# Patient Record
Sex: FEMALE | Race: WHITE | NOT HISPANIC OR LATINO | URBAN - METROPOLITAN AREA
[De-identification: names, ages, dates, MRNs, and addresses within clinical notes are randomized per-mention and may not be internally consistent; named-entity substitution may affect disease eponyms.]

---

## 2024-03-11 ENCOUNTER — TELEPHONE (OUTPATIENT)
Dept: CARDIOLOGY | Facility: HOSPITAL | Age: 59
End: 2024-03-11
Payer: MEDICARE

## 2024-03-30 DIAGNOSIS — I50.22 CHRONIC SYSTOLIC HEART FAILURE (MULTI): Primary | ICD-10-CM

## 2024-04-01 RX ORDER — SPIRONOLACTONE 25 MG/1
25 TABLET ORAL DAILY
Qty: 90 TABLET | Refills: 3 | Status: SHIPPED | OUTPATIENT
Start: 2024-04-01 | End: 2025-04-01

## 2024-05-09 DIAGNOSIS — I50.20 UNSPECIFIED SYSTOLIC (CONGESTIVE) HEART FAILURE (MULTI): ICD-10-CM

## 2024-05-09 RX ORDER — SACUBITRIL AND VALSARTAN 49; 51 MG/1; MG/1
1 TABLET, FILM COATED ORAL 2 TIMES DAILY
Qty: 180 TABLET | Refills: 0 | Status: SHIPPED | OUTPATIENT
Start: 2024-05-09

## 2024-07-19 DIAGNOSIS — I50.22 CHRONIC SYSTOLIC HEART FAILURE (MULTI): Primary | ICD-10-CM

## 2024-08-07 DIAGNOSIS — I50.22 CHRONIC SYSTOLIC HEART FAILURE (MULTI): Primary | ICD-10-CM

## 2024-08-07 RX ORDER — VALSARTAN 80 MG/1
80 TABLET ORAL 2 TIMES DAILY
Qty: 60 TABLET | Refills: 1 | Status: SHIPPED | OUTPATIENT
Start: 2024-08-07

## 2024-08-22 ENCOUNTER — APPOINTMENT (OUTPATIENT)
Dept: CARDIOLOGY | Facility: HOSPITAL | Age: 59
End: 2024-08-22
Payer: MEDICARE

## 2024-09-05 ENCOUNTER — APPOINTMENT (OUTPATIENT)
Dept: CARDIOLOGY | Facility: HOSPITAL | Age: 59
End: 2024-09-05
Payer: MEDICARE

## 2024-09-05 ENCOUNTER — OFFICE VISIT (OUTPATIENT)
Dept: CARDIOLOGY | Facility: HOSPITAL | Age: 59
End: 2024-09-05
Payer: MEDICARE

## 2024-09-05 ENCOUNTER — HOSPITAL ENCOUNTER (OUTPATIENT)
Dept: CARDIOLOGY | Facility: HOSPITAL | Age: 59
Discharge: HOME | End: 2024-09-05
Payer: MEDICARE

## 2024-09-05 VITALS
OXYGEN SATURATION: 94 % | HEART RATE: 73 BPM | WEIGHT: 200.4 LBS | HEIGHT: 62 IN | DIASTOLIC BLOOD PRESSURE: 76 MMHG | BODY MASS INDEX: 36.88 KG/M2 | SYSTOLIC BLOOD PRESSURE: 115 MMHG

## 2024-09-05 DIAGNOSIS — I50.22 CHRONIC SYSTOLIC HEART FAILURE (MULTI): Primary | ICD-10-CM

## 2024-09-05 DIAGNOSIS — I25.5 ISCHEMIC CARDIOMYOPATHY: ICD-10-CM

## 2024-09-05 DIAGNOSIS — I25.10 ASCVD (ARTERIOSCLEROTIC CARDIOVASCULAR DISEASE): ICD-10-CM

## 2024-09-05 DIAGNOSIS — I50.22 CHRONIC SYSTOLIC HEART FAILURE (MULTI): ICD-10-CM

## 2024-09-05 DIAGNOSIS — E78.5 DYSLIPIDEMIA: ICD-10-CM

## 2024-09-05 PROCEDURE — 99214 OFFICE O/P EST MOD 30 MIN: CPT | Performed by: INTERNAL MEDICINE

## 2024-09-05 PROCEDURE — 93005 ELECTROCARDIOGRAM TRACING: CPT | Performed by: INTERNAL MEDICINE

## 2024-09-05 PROCEDURE — 93306 TTE W/DOPPLER COMPLETE: CPT

## 2024-09-05 PROCEDURE — 1036F TOBACCO NON-USER: CPT | Performed by: INTERNAL MEDICINE

## 2024-09-05 PROCEDURE — 2500000004 HC RX 250 GENERAL PHARMACY W/ HCPCS (ALT 636 FOR OP/ED): Performed by: INTERNAL MEDICINE

## 2024-09-05 PROCEDURE — 93306 TTE W/DOPPLER COMPLETE: CPT | Performed by: STUDENT IN AN ORGANIZED HEALTH CARE EDUCATION/TRAINING PROGRAM

## 2024-09-05 PROCEDURE — 3008F BODY MASS INDEX DOCD: CPT | Performed by: INTERNAL MEDICINE

## 2024-09-05 RX ORDER — ASPIRIN 81 MG/1
81 TABLET ORAL DAILY
COMMUNITY

## 2024-09-05 RX ORDER — METOPROLOL SUCCINATE 100 MG/1
150 TABLET, EXTENDED RELEASE ORAL DAILY
COMMUNITY

## 2024-09-05 RX ORDER — FUROSEMIDE 20 MG/1
20 TABLET ORAL DAILY
COMMUNITY

## 2024-09-05 RX ORDER — VALSARTAN 80 MG/1
80 TABLET ORAL 2 TIMES DAILY
Qty: 180 TABLET | Refills: 3 | Status: SHIPPED | OUTPATIENT
Start: 2024-09-05

## 2024-09-05 RX ORDER — LEVOTHYROXINE SODIUM 150 UG/1
150 TABLET ORAL DAILY
COMMUNITY

## 2024-09-05 RX ORDER — ATORVASTATIN CALCIUM 80 MG/1
80 TABLET, FILM COATED ORAL DAILY
COMMUNITY

## 2024-09-05 RX ORDER — METFORMIN HYDROCHLORIDE 1000 MG/1
1000 TABLET ORAL
COMMUNITY

## 2024-09-05 RX ORDER — CALCIUM CARBONATE 300MG(750)
400 TABLET,CHEWABLE ORAL DAILY
COMMUNITY

## 2024-09-05 ASSESSMENT — PAIN SCALES - GENERAL: PAINLEVEL: 0-NO PAIN

## 2024-09-05 NOTE — PROGRESS NOTES
[facility] Advanced Heart Failure [facility]  Primary Care Physician: No primary care provider on file.  Referring Provider/Cardiologist: Dr. Couch at [facility]     Date of Visit: 09/05/2024  1:00 PM EDT  Location of visit: [facility]     HPI:   Ms. 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     Interval Hx:   Currently denies chest pain or LE edema. She does report intermittent palpitations and dyspnea with exertion, but overall feels better since she was last seen.     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      atorvastatin (Lipitor) 80 mg tablet Take 1 tablet (80 mg) by mouth once daily.      furosemide (Lasix) 20 mg tablet Take 1 tablet (20 mg) by mouth once daily.      insulin aspart, niacinamide, (FIASP  "FLEXTOUCH U-100 INSULIN SUBQ) Inject 40 Units under the skin 2 times a day. Take as directed per insulin instructions.      levothyroxine (Synthroid, Levoxyl) 150 mcg tablet Take 1 tablet (150 mcg) by mouth early in the morning.. Take on an empty stomach at the same time each day, either 30 to 60 minutes prior to breakfast      magnesium oxide (Mag-Ox) 400 mg tablet Take 1 tablet (400 mg) by mouth once daily.      metFORMIN (Glucophage) 1,000 mg tablet Take 1 tablet (1,000 mg) by mouth 2 times daily (morning and late afternoon).      metoprolol succinate XL (Toprol-XL) 100 mg 24 hr tablet Take 1.5 tablets (150 mg) by mouth once daily. Do not crush or chew.      spironolactone (Aldactone) 25 mg tablet Take 1 tablet (25 mg) by mouth once daily. 90 tablet 3    valsartan (Diovan) 80 mg tablet Take 1 tablet (80 mg) by mouth 2 times a day. 180 tablet 3     No current facility-administered medications for this visit.       Allergies   Allergen Reactions    Heparin Other     JUSTIN         Visit Vitals  /76 (BP Location: Left arm, Patient Position: Sitting)   Pulse 73   Ht 1.575 m (5' 2\")   Wt 90.9 kg (200 lb 6.4 oz)   SpO2 94%   BMI 36.65 kg/m²   Smoking Status Never   BSA 1.99 m²        Physical Exam:  On exam Ms. Ozuna appears her stated age, is alert and oriented x3, and in no acute distress. Her sclera are anicteric and her oropharynx has moist mucous membranes. Her neck is supple and without thyromegaly. The JVP is ~7 cm of water above the right atrium. Her cardiac exam has regular rhythm, normal S1, S2. No S3/4. There are no murmurs. Her lungs are clear to auscultation bilaterally and there is no dullness to percussion. Her abdomen is soft, nontender with normoactive bowel sounds. There is no HJR. The extremities are warm and without edema. The skin is dry. There is no rash present. The distal pulses are 2+ in all four extremities. Her mood and affect are appropriate for todays encounter.       Cardiac " Labs/Diagnostics:    Lab Results   Component Value Date    CREATININE 0.98 09/09/2019    BUN 16 09/09/2019     09/09/2019    K 4.4 09/09/2019    CL 97 (L) 09/09/2019    CO2 27 09/09/2019        Recent Labs     09/09/19  1146 07/01/19  1754 06/29/18  0904   CHOL 164 223* 111   LDLF - - 44   HDL 32.9* 39.7* 28.0*   TRIG 519* 449* 193*       Recent Labs     09/09/19  1146 07/01/19  1754 07/06/18  1900 06/28/18  1131   * 112* 323* 1,029*       ECG (9/5/24):  Sinus rhythm (HR 72), PVC, septal infarct    Echo (9/5/24):  1. Left ventricular ejection fraction is mildly decreased, by visual estimate at 40-45%.  2. Multiple segmental abnormalities exist. See findings.  3. Spectral Doppler shows an impaired relaxation pattern of left ventricular diastolic filling.  4. Left ventricular cavity size is moderately dilated.  5. No left ventricular thrombus visualized.  6. There is low normal right ventricular systolic function.  7. Mildly enlarged right ventricle.  8. The left atrium is mildly dilated.  9. RVSP within normal limits.  10. Compared with study dated 6/1/2023, the LVEF is unchanged. The apex and inferior wall were better visualized today. The apical segment appears dyskinetic in today's assessment and with a more extensive inferior wall hypokinesis. The LV filling is grade I today with normal LAP which appears unchanged from 6/2023 on side by side comparison.    Echo (6/1/23):  LVEF 40-45%    Echo (8/18/22):  1. Left ventricular systolic function is mildly to moderately decreased with a 45% estimated ejection fraction.  2. Abnormal septal motion consistent with post-operative status.  3. Spectral Doppler shows a pseudonormal pattern of left ventricular diastolic filling.  4. There is moderate eccentric left ventricular hypertrophy.  5. RVSP within normal limits.  6. There is global hypokinesis of the left ventricle with minor regional variations.    ECG (8/18/22):  Sinus rhythm (HR 67), LAD, septal  infarct    cMRI (9/23/21):  1. Normal LV size (EDVi 64 ml/m2) with moderately reduced systolic function (LVEF 37%) with akinesis of the inferior and lateral basal to mid wall  2. Normal right ventricular size and function with quantitative RVEF 50 %.  3. There is near transmural infarction in the mid inferior/inferolateral segments (LCx vs RCA territory) with subendocardial enhancement in the apical segments. Minimal viability is noted in the mid to apical lateral segments (LCx vs RCA territory). Remainder of myocardium appears to be viable.    Echo (11/19/20):  1. The left ventricular systolic function is moderately decreased with a 35-40% estimated ejection fraction.  2. Spectral Doppler shows an impaired relaxation pattern of left ventricular diastolic filling.  3. The left atrium is moderately dilated.  4. RVSP within normal limits.  5. There are multiple wall motion abnormalities.    ECG (11/19/20):  Sinus rhythm (HR 67), septal infarct, inferolateral TWIs    Echo (10/17/19):  1. The left ventricular systolic function is severely decreased with a 35% estimated ejection fraction.  2. Spectral Doppler shows an impaired relaxation pattern of left ventricular diastolic filling.    RHC (7/2/19):  1. Low filling pressure, preserved cardiac output.    Echo (7/2/19):  1. The left ventricular systolic function is moderately to severely decreased with a 30-35% estimated ejection fraction.  2. Abnormal septal motion consistent with post-operative status.  3. Spectral Doppler shows an impaired relaxation pattern of left ventricular diastolic filling.  4. RVSP within normal limits.    Echo (2/14/19):  1. The left ventricular systolic function is moderately decreased with a 35-40% estimated ejection fraction.  2. Basal and mid inferolateral wall, apical septal segment, apical anterior segment, and apex are abnormal.  3. Abnormal septal motion consistent with post-operative status.  4. Spectral Doppler shows an abnormal  pattern of left ventricular diastolic filling.  5. The left atrium is moderately dilated.  6. RVSP within normal limits.  7. EF not appreciably changed c/w priors, no LV apical thrombus seen.    Echo (9/6/18):  1. The left ventricular systolic function is moderately decreased with a 30-35% estimated ejection fraction.  2. Abnormal septal motion consistent with post-operative status.  3. Spectral Doppler shows an impaired relaxation pattern of left ventricular diastolic filling.  4. RVSP within normal limits.  5. Compared to 7/11/2018, ther are no significant changes.       Impression/Plan:  Ms. 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 At the current time she has functional class II symptoms and is euvolemic on exam.     1) Stage C chronic systolic HF/ICM/HFrEF with mild to moderate LV dysfunction (LVEF 40-45%; 9/2024) currently without an ICD  cMRI scar burden 25%. Cannot afford SGLT2. Most recent ; 6/7/24). Repeat echo with official report of 40-45, but appears slightly better than that. Stable renal function (Cr 1.1; 6/7/24).   -c/w metoprolol succinate 150 mg daily, spironolactone 25 mg daily, furosemide 20 mg daily  -alternate between valsartan 80 mg bid and entresto 49/51 mg bid (ARB when in donut hole)  -defer ICD for now    2) CAD s/p PCI (RCA; 6/2018) and CABG (LIMA to LAD; 7/2018)  Currently without angina.   -c/w ASA 81 mg daily, atorvastatin 80 mg daily, beta blocker    3)  Dyslipidemia  Lipids (10/31/23): tChol 136, HDL 38, LDL 50, trigs 240  -c/w statin    4) HIT with multiple DVTs  Completed 3 months of OAC    5) Hypothyroidism  -c/w levothyroxine    6) DM  Poorly controlled sugars, currently trying various insulin regimens. Previously discussed metabolic surgery given BMI >35, HFrEF, and DM  -may consider finerenone in the future given DM and CKD      F/U: 1 year at /[facility] 1800      ____________________________________________________________  Scotty Delgadillo DO  Section of Advanced Heart Failure and Cardiac Transplantation  Division of Cardiovascular Medicine  [facility]  [facility]

## 2024-09-05 NOTE — PATIENT INSTRUCTIONS
It was a pleasure seeing you today. Please contact myself or my team with any questions.     To reach Dr. Delgadillo' office please call [phone] ([place]).   Fax: [phone]   To schedule an appointment call [phone]     If you have any questions or need cardiac medication refills, please call the Heart Failure office at [phone], option 6. You may also contact the  Heart Failure Nursing team via email at [email or web address] (Please include your name and date of birth).        1) Continue your current medications  2) Follow up in 1 year at /[facility] Ba

## 2024-09-06 LAB
AORTIC VALVE PEAK VELOCITY: 1.39 M/S
AV PEAK GRADIENT: 7.7 MMHG
AVA (PEAK VEL): 2.59 CM2
EJECTION FRACTION APICAL 4 CHAMBER: 52
EJECTION FRACTION: 43 %
LEFT ATRIUM VOLUME AREA LENGTH INDEX BSA: 35.3 ML/M2
LEFT VENTRICLE INTERNAL DIMENSION DIASTOLE: 4.98 CM (ref 3.5–6)
LEFT VENTRICULAR OUTFLOW TRACT DIAMETER: 2.2 CM
MITRAL VALVE E/A RATIO: 0.61
RIGHT VENTRICLE FREE WALL PEAK S': 9 CM/S
RIGHT VENTRICLE PEAK SYSTOLIC PRESSURE: 24.9 MMHG
TRICUSPID ANNULAR PLANE SYSTOLIC EXCURSION: 1.6 CM

## 2024-09-07 LAB
ATRIAL RATE: 72 BPM
P AXIS: 26 DEGREES
P OFFSET: 202 MS
P ONSET: 136 MS
PR INTERVAL: 176 MS
Q ONSET: 224 MS
QRS COUNT: 12 BEATS
QRS DURATION: 116 MS
QT INTERVAL: 424 MS
QTC CALCULATION(BAZETT): 464 MS
QTC FREDERICIA: 450 MS
R AXIS: -21 DEGREES
T AXIS: 163 DEGREES
T OFFSET: 436 MS
VENTRICULAR RATE: 72 BPM

## 2024-11-12 DIAGNOSIS — I50.20 UNSPECIFIED SYSTOLIC (CONGESTIVE) HEART FAILURE: ICD-10-CM

## 2024-11-12 RX ORDER — LANOLIN ALCOHOL/MO/W.PET/CERES
1 CREAM (GRAM) TOPICAL DAILY
Qty: 90 TABLET | Refills: 3 | Status: SHIPPED | OUTPATIENT
Start: 2024-11-12

## 2025-03-23 DIAGNOSIS — I50.22 CHRONIC SYSTOLIC HEART FAILURE: ICD-10-CM

## 2025-03-25 RX ORDER — SPIRONOLACTONE 25 MG/1
25 TABLET ORAL DAILY
Qty: 90 TABLET | Refills: 1 | Status: SHIPPED | OUTPATIENT
Start: 2025-03-25

## 2025-06-02 DIAGNOSIS — I50.22 CHRONIC SYSTOLIC HEART FAILURE: Primary | ICD-10-CM

## 2025-06-02 DIAGNOSIS — I25.5 ISCHEMIC CARDIOMYOPATHY: ICD-10-CM
